# Patient Record
Sex: MALE | Race: WHITE | Employment: FULL TIME | RURAL
[De-identification: names, ages, dates, MRNs, and addresses within clinical notes are randomized per-mention and may not be internally consistent; named-entity substitution may affect disease eponyms.]

---

## 2024-07-05 ENCOUNTER — HOSPITAL ENCOUNTER (EMERGENCY)
Facility: HOSPITAL | Age: 24
Discharge: HOME | End: 2024-07-05
Attending: HOSPITALIST
Payer: MEDICAID

## 2024-07-05 ENCOUNTER — APPOINTMENT (OUTPATIENT)
Dept: CT IMAGING | Facility: HOSPITAL | Age: 24
End: 2024-07-05
Attending: HOSPITALIST
Payer: MEDICAID

## 2024-07-05 VITALS
BODY MASS INDEX: 26.11 KG/M2 | SYSTOLIC BLOOD PRESSURE: 126 MMHG | HEART RATE: 74 BPM | HEIGHT: 75 IN | RESPIRATION RATE: 18 BRPM | DIASTOLIC BLOOD PRESSURE: 70 MMHG | TEMPERATURE: 98.1 F | WEIGHT: 210 LBS | OXYGEN SATURATION: 97 %

## 2024-07-05 DIAGNOSIS — K52.9 ILEITIS: Primary | ICD-10-CM

## 2024-07-05 LAB
ALBUMIN SERPL-MCNC: 4.1 G/DL (ref 3.4–4.8)
ALBUMIN/GLOB SERPL: 1.3 {RATIO} (ref 0.8–2)
ALP SERPL-CCNC: 121 U/L (ref 25–100)
ALT SERPL-CCNC: 23 U/L (ref 4–36)
ANION GAP SERPL CALCULATED.3IONS-SCNC: 10 MMOL/L (ref 3–16)
AST SERPL-CCNC: 23 U/L (ref 8–33)
BASOPHILS # BLD: 0 K/UL (ref 0–0.1)
BASOPHILS NFR BLD: 0.4 %
BILIRUB SERPL-MCNC: 0.3 MG/DL (ref 0.3–1.2)
BILIRUB UR QL STRIP.AUTO: NEGATIVE
BUN SERPL-MCNC: 16 MG/DL (ref 6–20)
CALCIUM SERPL-MCNC: 9.1 MG/DL (ref 8.5–10.5)
CHLORIDE SERPL-SCNC: 105 MMOL/L (ref 98–107)
CLARITY UR: CLEAR
CO2 SERPL-SCNC: 23 MMOL/L (ref 20–30)
COLOR UR: YELLOW
CREAT SERPL-MCNC: 1 MG/DL (ref 0.4–1.2)
EOSINOPHIL # BLD: 0.1 K/UL (ref 0–0.4)
EOSINOPHIL NFR BLD: 1 %
ERYTHROCYTE [DISTWIDTH] IN BLOOD BY AUTOMATED COUNT: 12.5 % (ref 11–16)
GFR SERPLBLD CREATININE-BSD FMLA CKD-EPI: >90 ML/MIN/{1.73_M2}
GLOBULIN SER CALC-MCNC: 3.2 G/DL
GLUCOSE SERPL-MCNC: 98 MG/DL (ref 74–106)
GLUCOSE UR STRIP.AUTO-MCNC: NEGATIVE MG/DL
HCT VFR BLD AUTO: 40.8 % (ref 40–54)
HGB BLD-MCNC: 13.7 G/DL (ref 13–18)
HGB UR QL STRIP.AUTO: NEGATIVE
IMM GRANULOCYTES # BLD: 0 K/UL
IMM GRANULOCYTES NFR BLD: 0.4 % (ref 0–5)
KETONES UR STRIP.AUTO-MCNC: NEGATIVE MG/DL
LEUKOCYTE ESTERASE UR QL STRIP.AUTO: NEGATIVE
LIPASE SERPL-CCNC: 29 U/L (ref 5.6–51.3)
LYMPHOCYTES # BLD: 1.5 K/UL (ref 1.5–4)
LYMPHOCYTES NFR BLD: 22.1 %
MCH RBC QN AUTO: 28.4 PG (ref 27–32)
MCHC RBC AUTO-ENTMCNC: 33.6 G/DL (ref 31–35)
MCV RBC AUTO: 84.5 FL (ref 80–100)
MONOCYTES # BLD: 0.7 K/UL (ref 0.2–0.8)
MONOCYTES NFR BLD: 9.7 %
NEUTROPHILS # BLD: 4.5 K/UL (ref 2–7.5)
NEUTS SEG NFR BLD: 66.4 %
NITRITE UR QL STRIP.AUTO: NEGATIVE
PH UR STRIP.AUTO: 6.5 [PH] (ref 5–8)
PLATELET # BLD AUTO: 251 K/UL (ref 150–400)
PMV BLD AUTO: 9.6 FL (ref 6–10)
POTASSIUM SERPL-SCNC: 4.1 MMOL/L (ref 3.4–5.1)
PROT SERPL-MCNC: 7.3 G/DL (ref 6.4–8.3)
PROT UR STRIP.AUTO-MCNC: NEGATIVE MG/DL
RBC # BLD AUTO: 4.83 M/UL (ref 4.5–6)
SODIUM SERPL-SCNC: 138 MMOL/L (ref 136–145)
SP GR UR STRIP.AUTO: 1.02 (ref 1–1.03)
UA COMPLETE W REFLEX CULTURE PNL UR: NORMAL
UA DIPSTICK W REFLEX MICRO PNL UR: NORMAL
URN SPEC COLLECT METH UR: NORMAL
UROBILINOGEN UR STRIP-ACNC: 0.2 E.U./DL
WBC # BLD AUTO: 6.7 K/UL (ref 4–11)

## 2024-07-05 PROCEDURE — 74176 CT ABD & PELVIS W/O CONTRAST: CPT

## 2024-07-05 PROCEDURE — 36415 COLL VENOUS BLD VENIPUNCTURE: CPT

## 2024-07-05 PROCEDURE — 80053 COMPREHEN METABOLIC PANEL: CPT

## 2024-07-05 PROCEDURE — 99284 EMERGENCY DEPT VISIT MOD MDM: CPT

## 2024-07-05 PROCEDURE — 96374 THER/PROPH/DIAG INJ IV PUSH: CPT

## 2024-07-05 PROCEDURE — 6360000002 HC RX W HCPCS: Performed by: HOSPITALIST

## 2024-07-05 PROCEDURE — 85025 COMPLETE CBC W/AUTO DIFF WBC: CPT

## 2024-07-05 PROCEDURE — 2580000003 HC RX 258: Performed by: HOSPITALIST

## 2024-07-05 PROCEDURE — 81003 URINALYSIS AUTO W/O SCOPE: CPT

## 2024-07-05 PROCEDURE — 83690 ASSAY OF LIPASE: CPT

## 2024-07-05 RX ORDER — AMOXICILLIN AND CLAVULANATE POTASSIUM 875; 125 MG/1; MG/1
1 TABLET, FILM COATED ORAL 2 TIMES DAILY
Qty: 20 TABLET | Refills: 0 | Status: SHIPPED | OUTPATIENT
Start: 2024-07-05 | End: 2024-07-15

## 2024-07-05 RX ORDER — KETOROLAC TROMETHAMINE 30 MG/ML
30 INJECTION, SOLUTION INTRAMUSCULAR; INTRAVENOUS ONCE
Status: COMPLETED | OUTPATIENT
Start: 2024-07-05 | End: 2024-07-05

## 2024-07-05 RX ORDER — 0.9 % SODIUM CHLORIDE 0.9 %
1000 INTRAVENOUS SOLUTION INTRAVENOUS ONCE
Status: COMPLETED | OUTPATIENT
Start: 2024-07-05 | End: 2024-07-05

## 2024-07-05 RX ADMIN — SODIUM CHLORIDE 1000 ML: 9 INJECTION, SOLUTION INTRAVENOUS at 16:06

## 2024-07-05 RX ADMIN — KETOROLAC TROMETHAMINE 30 MG: 30 INJECTION, SOLUTION INTRAMUSCULAR; INTRAVENOUS at 16:07

## 2024-07-05 ASSESSMENT — LIFESTYLE VARIABLES
HOW OFTEN DO YOU HAVE A DRINK CONTAINING ALCOHOL: NEVER
HOW MANY STANDARD DRINKS CONTAINING ALCOHOL DO YOU HAVE ON A TYPICAL DAY: PATIENT DOES NOT DRINK

## 2024-07-05 ASSESSMENT — PAIN SCALES - GENERAL
PAINLEVEL_OUTOF10: 4
PAINLEVEL_OUTOF10: 0

## 2024-07-05 ASSESSMENT — PAIN DESCRIPTION - LOCATION: LOCATION: ABDOMEN

## 2024-07-05 ASSESSMENT — PAIN DESCRIPTION - DESCRIPTORS: DESCRIPTORS: DULL

## 2024-07-05 ASSESSMENT — PAIN - FUNCTIONAL ASSESSMENT: PAIN_FUNCTIONAL_ASSESSMENT: 0-10

## 2024-07-05 ASSESSMENT — PAIN DESCRIPTION - ORIENTATION: ORIENTATION: RIGHT;UPPER

## 2024-07-05 NOTE — ED PROVIDER NOTES
[facility]  EMERGENCY DEPARTMENT ENCOUNTER        Pt Name: Ganesh Funez  MRN: [id number]  Birthdate 2000  Date of evaluation: 7/5/2024  Provider: Sriram Weber DO  PCP: No primary care provider on file.  Note Started: 3:53 PM EDT 7/5/24    CHIEF COMPLAINT       Chief Complaint   Patient presents with    Abdominal Pain       HISTORY OF PRESENT ILLNESS: 1 or more Elements     History from : Patient    Limitations to history : None    Ganesh Funez is a 24 y.o. male who presents to the emergency department for abdominal pain.  Patient states he laid down took a nap and upon waking he had abdominal discomfort and pain.  He states it was worse if he stood up.  He states it was sharp at first and much worse then than what it is now.  Patient states now the pain is more of a dull sensation he rates it may be as a 4 out of 5 out of 10 but he states previously it was a least an 8.  He did not have any nausea or vomiting with it.  Denies any diarrhea.  States he had normal bowel movement about 2 hours ago no bloody stool or dark tarry stool.  However he stated to the nursing staff that he had some darker colored urine but he did not mention this to me and denied any urinary change when asked.  Patient states now his discomfort feels more like bloating or gas but he has not had any burping or belching or no flatulence.  Patient denies any headache of the ordinary.  Denies any earache sore throat or runny nose.  Denies cough.  Denies chest pain or shortness of breath.  Denies dysuria or change in her frequency.  Denies nausea vomiting or diarrhea.  Positive abdominal pain.  Denies body aches out of ordinary.  Patient states he did wake up and he was sweating but he states he was under a \"fuzzy blanket\" so he thinks that might of been the reason for this.  But denies any sensation of chills.  Denies any sick contacts.  When asked if he ate or drink anything differently than he would  [facility] [id number]  [phone]    As needed, If symptoms worsen      DISCHARGE MEDICATIONS:  New Prescriptions    AMOXICILLIN-CLAVULANATE (AUGMENTIN) 875-125 MG PER TABLET    Take 1 tablet by mouth 2 times daily for 10 days       DISCONTINUED MEDICATIONS:  Discontinued Medications    No medications on file              (Please note that portions of this note were completed with a voice recognition program.  Efforts were made to edit the dictations but occasionally words are mis-transcribed.)    Sriram Weber DO (electronically signed)           Sriram Weber DO  07/05/24 [id number]

## 2024-07-05 NOTE — ED TRIAGE NOTES
Pt rpeorts he woke up from a nap around 1430, with intermittent RUQ pain. Denies any N/V/D.   Pt reports that he has had dark urine x 2 days.